# Patient Record
Sex: MALE | URBAN - METROPOLITAN AREA
[De-identification: names, ages, dates, MRNs, and addresses within clinical notes are randomized per-mention and may not be internally consistent; named-entity substitution may affect disease eponyms.]

---

## 2018-05-18 ENCOUNTER — OFFICE VISIT (OUTPATIENT)
Dept: DERMATOLOGY | Facility: CLINIC | Age: 33
End: 2018-05-18
Payer: COMMERCIAL

## 2018-05-18 DIAGNOSIS — B07.8 OTHER VIRAL WARTS: Primary | ICD-10-CM

## 2018-05-18 ASSESSMENT — PAIN SCALES - GENERAL: PAINLEVEL: NO PAIN (0)

## 2018-05-18 NOTE — LETTER
5/18/2018       RE: Jaxson Wiley  [address]  [facility] [address]     Dear Colleague,    Thank you for referring your patient, Jaxson Wiley, to the [facility] DERMATOLOGY at [facility]. Please see a copy of my visit note below.    [facility] Dermatology Note      Dermatology Problem List:  1. Verruca vulgaris, left lateral forehead: Treated with cryo 5/18/18    Encounter Date: May 18, 2018    CC:   Chief Complaint   Patient presents with     Derm Problem     Chilo is here today for a concern on his left side of his temple/hairline.      History of Present Illness:  Mr. Jaxson Wiley is a 32 year old male who presents in self referral for a lesion on his left temple. The patient first noticed this 2-3 months ago. Since then, he notes that it has slowly grown in size. It is not painful and has not bled. The patient has had warts on his feet and hands in the past, and he suspects this is the same. He is otherwise feeling well with no additional skin concerns at this time.    Past Medical History:   There is no problem list on file for this patient.    No past medical history on file.  No past surgical history on file.    Social History:  The patient works.    Family History:  No family history of melanoma    Medications:  No current outpatient prescriptions on file.      Not on File    Review of Systems:  -As per HPI  -Constitutional: The patient denies fatigue, fevers, chills, unintended weight loss, and night sweats.  -HEENT: Patient denies nonhealing oral sores.  -Skin: As above in HPI. No additional skin concerns.    Physical exam:  Vitals: There were no vitals taken for this visit.  GEN: This is a well developed, well-nourished male in no acute distress, in a pleasant mood.    SKIN: Focused examination of the face was performed.  - Left lateral forehead with approximately 6 mm flesh colored verrucous and filiform papule  - No other lesions of  concern on areas examined.     Impression/Plan:  1. Verruca vulgaris: The viral etiology was reviewed with the patient.     Cryotherapy procedure note: After verbal consent and discussion of risks and benefits including but no limited to dyspigmentation/scar, blister, and pain, 1 verruca on the left lateral forehead was(were) treated with 1-2mm freeze border for 2 cycles with liquid nitrogen. Post cryotherapy instructions were provided.   Will return in 4-6 weeks if not resolved    Follow-up prn in 4-6 weeks if not resolved    Dr. Montserrat Lopes staffed the patient.    Staff Involved:  Resident(Miguel Maya)/Staff(as above)     I saw and evaluated this patient with Dr. Corey. The above note has been read and reviewed and where appropriate amended and corrected. It accurately reflects my clinical findings, observations, diagnoses, treatment recommendations and follow-up plans.  I was present during the entire surgical procedure which was performed by Dr. Corey appropriately and without complication.    Montserrat Sandoval MD  Clinical Professor   Department of Dermatology  [facility]

## 2018-05-18 NOTE — PROGRESS NOTES
[facility] Dermatology Note      Dermatology Problem List:  1. Verruca vulgaris, left lateral forehead: Treated with cryo 5/18/18    Encounter Date: May 18, 2018    CC:   Chief Complaint   Patient presents with     Derm Problem     Chilo is here today for a concern on his left side of his temple/hairline.          History of Present Illness:  Mr. Jaxson Wiley is a 32 year old male who presents in self referral for a lesion on his left temple. The patient first noticed this 2-3 months ago. Since then, he notes that it has slowly grown in size. It is not painful and has not bled. The patient has had warts on his feet and hands in the past, and he suspects this is the same. He is otherwise feeling well with no additional skin concerns at this time.    Past Medical History:   There is no problem list on file for this patient.    No past medical history on file.  No past surgical history on file.    Social History:  The patient works.    Family History:  No family history of melanoma    Medications:  No current outpatient prescriptions on file.        Not on File      Review of Systems:  -As per HPI  -Constitutional: The patient denies fatigue, fevers, chills, unintended weight loss, and night sweats.  -HEENT: Patient denies nonhealing oral sores.  -Skin: As above in HPI. No additional skin concerns.    Physical exam:  Vitals: There were no vitals taken for this visit.  GEN: This is a well developed, well-nourished male in no acute distress, in a pleasant mood.    SKIN: Focused examination of the face was performed.  - Left lateral forehead with approximately 6 mm flesh colored verrucous and filiform papule  - No other lesions of concern on areas examined.     Impression/Plan:  1. Verruca vulgaris: The viral etiology was reviewed with the patient.     Cryotherapy procedure note: After verbal consent and discussion of risks and benefits including but no limited to dyspigmentation/scar, blister, and  pain, 1 verruca on the left lateral forehead was(were) treated with 1-2mm freeze border for 2 cycles with liquid nitrogen. Post cryotherapy instructions were provided.     Will return in 4-6 weeks if not resolved          Follow-up prn in 4-6 weeks if not resolved      Dr. Montserrat Lopes staffed the patient.    Staff Involved:  Resident(Miguel Maya)/Staff(as above)     I saw and evaluated this patient with Dr. Corey. The above note has been read and reviewed and where appropriate amended and corrected. It accurately reflects my clinical findings, observations, diagnoses, treatment recommendations and follow-up plans.  I was present during the entire surgical procedure which was performed by Dr. Corey appropriately and without complication.    Montserrat Sandoval MD  Clinical Professor   Department of Dermatology  [facility]

## 2018-05-18 NOTE — NURSING NOTE
Chief Complaint   Patient presents with     Derm Problem     Chilo is here today for a concern on his left side of his temple/hairline.      Ashley Chisholm LPN

## 2018-05-18 NOTE — MR AVS SNAPSHOT
After Visit Summary   5/18/2018    Jaxson Wiley    MRN: [id number]           Patient Information     Date Of Birth          1985        Visit Information        Provider Department      5/18/2018 8:30 AM Montserrat Sandoval MD [facility] Dermatology        Today's Diagnoses     Other viral warts    -  1       Follow-ups after your visit        Who to contact     Please call your clinic at [phone] to:    Ask questions about your health    Make or cancel appointments    Discuss your medicines    Learn about your test results    Speak to your doctor            Additional Information About Your Visit        MyChart Information     [employer] gives you secure access to your electronic health record. If you see a primary care provider, you can also send messages to your care team and make appointments. If you have questions, please call your primary care clinic.  If you do not have a primary care provider, please call [phone] and they will assist you.      [employer] is an electronic gateway that provides easy, online access to your medical records. With [employer], you can request a clinic appointment, read your test results, renew a prescription or communicate with your care team.     To access your existing account, please contact your [facility] or call [phone] for assistance.        Care EveryWhere ID     This is your Care EveryWhere ID. This could be used by other organizations to access your [facility] medical records  [id number]         Blood Pressure from Last 3 Encounters:   No data found for BP    Weight from Last 3 Encounters:   No data found for Wt              We Performed the Following     DESTRUCT BENIGN LESION, UP TO 14        Primary Care Provider Fax #    Physician No Ref-Primary [phone]       No address on file        Equal Access to Services     TIFFANY JAVED : Crissy Ferreira, karla adams, cliff schneider  kelly trevinoade browneaan ah. So [facility] [phone].    ATENCIÓN: Si habla tereza, tiene a marroquin disposición servicios gratuitos de asistencia lingüística. Llame al [phone].    We comply with applicable federal civil rights laws and [place] laws. We do not discriminate on the basis of race, color, national origin, age, disability, sex, sexual orientation, or gender identity.            Thank you!     Thank you for choosing [facility] DERMATOLOGY  for your care. Our goal is always to provide you with excellent care. Hearing back from our patients is one way we can continue to improve our services. Please take a few minutes to complete the written survey that you may receive in the mail after your visit with us. Thank you!             Your Updated Medication List - Protect others around you: Learn how to safely use, store and throw away your medicines at www.disposemymeds.org.      Notice  As of 5/18/2018 11:48 AM    You have not been prescribed any medications.

## 2020-03-11 ENCOUNTER — HEALTH MAINTENANCE LETTER (OUTPATIENT)
Age: 35
End: 2020-03-11

## 2021-01-03 ENCOUNTER — HEALTH MAINTENANCE LETTER (OUTPATIENT)
Age: 36
End: 2021-01-03

## 2021-04-25 ENCOUNTER — HEALTH MAINTENANCE LETTER (OUTPATIENT)
Age: 36
End: 2021-04-25

## 2021-10-10 ENCOUNTER — HEALTH MAINTENANCE LETTER (OUTPATIENT)
Age: 36
End: 2021-10-10

## 2022-05-21 ENCOUNTER — HEALTH MAINTENANCE LETTER (OUTPATIENT)
Age: 37
End: 2022-05-21

## 2022-09-18 ENCOUNTER — HEALTH MAINTENANCE LETTER (OUTPATIENT)
Age: 37
End: 2022-09-18

## 2023-06-04 ENCOUNTER — HEALTH MAINTENANCE LETTER (OUTPATIENT)
Age: 38
End: 2023-06-04